# Patient Record
Sex: MALE | Race: WHITE | NOT HISPANIC OR LATINO
[De-identification: names, ages, dates, MRNs, and addresses within clinical notes are randomized per-mention and may not be internally consistent; named-entity substitution may affect disease eponyms.]

---

## 2017-08-10 ENCOUNTER — APPOINTMENT (OUTPATIENT)
Dept: MRI IMAGING | Facility: CLINIC | Age: 62
End: 2017-08-10

## 2017-10-25 ENCOUNTER — RESULT REVIEW (OUTPATIENT)
Age: 62
End: 2017-10-25

## 2019-05-04 ENCOUNTER — EMERGENCY (EMERGENCY)
Facility: HOSPITAL | Age: 64
LOS: 1 days | Discharge: HOME | End: 2019-05-04
Attending: EMERGENCY MEDICINE
Payer: COMMERCIAL

## 2019-05-04 VITALS
HEART RATE: 120 BPM | OXYGEN SATURATION: 99 % | WEIGHT: 210.1 LBS | SYSTOLIC BLOOD PRESSURE: 185 MMHG | DIASTOLIC BLOOD PRESSURE: 97 MMHG | TEMPERATURE: 98 F | RESPIRATION RATE: 16 BRPM | HEIGHT: 69 IN

## 2019-05-04 VITALS
TEMPERATURE: 98 F | SYSTOLIC BLOOD PRESSURE: 182 MMHG | HEART RATE: 102 BPM | OXYGEN SATURATION: 99 % | DIASTOLIC BLOOD PRESSURE: 92 MMHG | RESPIRATION RATE: 18 BRPM

## 2019-05-04 LAB
APPEARANCE UR: CLEAR — SIGNIFICANT CHANGE UP
BACTERIA # UR AUTO: 0 — SIGNIFICANT CHANGE UP
BILIRUB UR-MCNC: NEGATIVE — SIGNIFICANT CHANGE UP
COLOR SPEC: YELLOW — SIGNIFICANT CHANGE UP
DIFF PNL FLD: NEGATIVE — SIGNIFICANT CHANGE UP
EPI CELLS # UR: 0 /HPF — SIGNIFICANT CHANGE UP
GLUCOSE UR QL: NEGATIVE — SIGNIFICANT CHANGE UP
HYALINE CASTS # UR AUTO: 0 /LPF — SIGNIFICANT CHANGE UP (ref 0–2)
KETONES UR-MCNC: NEGATIVE — SIGNIFICANT CHANGE UP
LEUKOCYTE ESTERASE UR-ACNC: NEGATIVE — SIGNIFICANT CHANGE UP
NITRITE UR-MCNC: NEGATIVE — SIGNIFICANT CHANGE UP
PH UR: 6.5 — SIGNIFICANT CHANGE UP (ref 5–8)
PROT UR-MCNC: 100 — SIGNIFICANT CHANGE UP
RBC CASTS # UR COMP ASSIST: 2 /HPF — SIGNIFICANT CHANGE UP (ref 0–4)
SP GR SPEC: 1.01 — SIGNIFICANT CHANGE UP (ref 1.01–1.02)
UROBILINOGEN FLD QL: NEGATIVE — SIGNIFICANT CHANGE UP
WBC UR QL: 0 /HPF — SIGNIFICANT CHANGE UP (ref 0–5)

## 2019-05-04 PROCEDURE — 81001 URINALYSIS AUTO W/SCOPE: CPT

## 2019-05-04 PROCEDURE — 87086 URINE CULTURE/COLONY COUNT: CPT

## 2019-05-04 PROCEDURE — 99284 EMERGENCY DEPT VISIT MOD MDM: CPT | Mod: 25

## 2019-05-04 PROCEDURE — 51702 INSERT TEMP BLADDER CATH: CPT

## 2019-05-04 PROCEDURE — 51798 US URINE CAPACITY MEASURE: CPT

## 2019-05-04 NOTE — ED ADULT NURSE REASSESSMENT NOTE - NS ED NURSE REASSESS COMMENT FT1
MIGEL Marie placed khanna catheter with MD Bess at bedside to confirm sterility. 900cc urine drained from khanna.  Pt reporting mild relief after catheter insertion.

## 2019-05-04 NOTE — ED PROVIDER NOTE - CARE PROVIDER_API CALL
Goldberg, Gary D (MD)  Urology  [address] [place] [address], [address]  [place] [address]  Phone: [phone]  Fax: [phone]  Follow Up Time:

## 2019-05-04 NOTE — ED PROVIDER NOTE - ATTENDING CONTRIBUTION TO CARE
Attending MD Bess:   I personally have seen and examined this patient.  Physician assistant note reviewed and agree on plan of care and except where noted.  See below for details.     Seen in [facility]  Dr. Gary Goldberg, Uro    63M with PMH/PSH including BPH, HTN on Amlodipine, , HLD on Rosuvastatin, fatty liver presents to the ED with urinary retention.  Reports was able to urinate this morning.  Reports had MRI of prostate in April, was told it was enlarged, but otherwise "fine".  Denies vomiting, diarrhea, blood in stools. Reports was able to move stools.  Reports flew back from [place], [place] and came straight here.  Denies fevers, chills.  Denies chest pain, shortness of breath, palpitations.  On exam, appears uncomfortable, unlabored breathing, abdomen soft, mild suprapubic tenderness that resolved with khanna placement, no testicular tenderness, no genital lesions, khanna placed during initial assessment (see procedure note), immediate drainage of 900cc of yellow clear urine.  A/P: 63M with urinary retention, enlarged prostate on US and at least 900cc of Ur output, will check UA, UrCx to rule out infection but low suspicion, will send out with leg bag and follow up with Dr. Goldberg on Monday 5/6/19

## 2019-05-04 NOTE — ED PROVIDER NOTE - NSFOLLOWUPINSTRUCTIONS_ED_ALL_ED_FT
1- Follow up with Dr Goldberg on Monday as planned  2- Motrin / Tylenol as needed for pain  3- Any worsening pain, new symptoms come back to the ER immediately

## 2019-05-04 NOTE — ED PROVIDER NOTE - NS ED ROS FT
Attending MD Bess: See Attending Statement
Class II - visualization of the soft palate, fauces, and uvula

## 2019-05-04 NOTE — ED ADULT NURSE NOTE - OBJECTIVE STATEMENT
64 y/o male PMH BPH, HTN, presents to ED c/o urinary retention. Pt states he was unable to urinate this morning. Took a 5-6 hour flight from [place] this morning and was in "excruciating pain" the entire flight. Reports urinary frequency, but is unable to start stream. Has MRI of prostate in April, was otld it was enlarged and to take flomax, but pt decided against the med because he didn't want to be on medication. Denying n/v/d, fever, chills, hematuria.

## 2019-05-05 LAB
CULTURE RESULTS: NO GROWTH — SIGNIFICANT CHANGE UP
SPECIMEN SOURCE: SIGNIFICANT CHANGE UP

## 2020-07-13 PROBLEM — N40.1 BENIGN PROSTATIC HYPERPLASIA WITH LOWER URINARY TRACT SYMPTOMS: Chronic | Status: ACTIVE | Noted: 2019-05-04

## 2020-07-28 DIAGNOSIS — Z01.818 ENCOUNTER FOR OTHER PREPROCEDURAL EXAMINATION: ICD-10-CM

## 2020-07-30 ENCOUNTER — APPOINTMENT (OUTPATIENT)
Dept: DISASTER EMERGENCY | Facility: CLINIC | Age: 65
End: 2020-07-30

## 2020-07-30 LAB — SARS-COV-2 N GENE NPH QL NAA+PROBE: NOT DETECTED

## 2020-08-11 ENCOUNTER — TRANSCRIPTION ENCOUNTER (OUTPATIENT)
Age: 65
End: 2020-08-11

## 2021-10-15 ENCOUNTER — APPOINTMENT (OUTPATIENT)
Dept: INTERVENTIONAL RADIOLOGY/VASCULAR | Facility: CLINIC | Age: 66
End: 2021-10-15
Payer: MEDICARE

## 2021-10-15 DIAGNOSIS — N28.1 CYST OF KIDNEY, ACQUIRED: ICD-10-CM

## 2021-10-15 DIAGNOSIS — I10 ESSENTIAL (PRIMARY) HYPERTENSION: ICD-10-CM

## 2021-10-15 DIAGNOSIS — Z78.9 OTHER SPECIFIED HEALTH STATUS: ICD-10-CM

## 2021-10-15 DIAGNOSIS — Z87.891 PERSONAL HISTORY OF NICOTINE DEPENDENCE: ICD-10-CM

## 2021-10-15 DIAGNOSIS — E78.5 HYPERLIPIDEMIA, UNSPECIFIED: ICD-10-CM

## 2021-10-15 DIAGNOSIS — K21.9 GASTRO-ESOPHAGEAL REFLUX DISEASE W/OUT ESOPHAGITIS: ICD-10-CM

## 2021-10-15 PROCEDURE — 99204 OFFICE O/P NEW MOD 45 MIN: CPT | Mod: 95

## 2021-10-15 RX ORDER — LISINOPRIL 10 MG/1
10 TABLET ORAL DAILY
Refills: 0 | Status: ACTIVE | COMMUNITY
Start: 2021-10-15

## 2021-10-15 RX ORDER — AMLODIPINE BESYLATE 10 MG/1
10 TABLET ORAL DAILY
Refills: 0 | Status: ACTIVE | COMMUNITY
Start: 2021-10-15

## 2021-10-26 ENCOUNTER — APPOINTMENT (OUTPATIENT)
Dept: CT IMAGING | Facility: CLINIC | Age: 66
End: 2021-10-26

## 2021-10-26 PROCEDURE — 72191 CT ANGIOGRAPH PELV W/O&W/DYE: CPT | Mod: 26,MH

## 2021-10-29 ENCOUNTER — OUTPATIENT (OUTPATIENT)
Dept: OUTPATIENT SERVICES | Facility: HOSPITAL | Age: 66
LOS: 1 days | End: 2021-10-29

## 2021-10-29 VITALS
HEIGHT: 69 IN | SYSTOLIC BLOOD PRESSURE: 152 MMHG | OXYGEN SATURATION: 97 % | TEMPERATURE: 99 F | HEART RATE: 96 BPM | WEIGHT: 188.94 LBS | RESPIRATION RATE: 18 BRPM | DIASTOLIC BLOOD PRESSURE: 93 MMHG

## 2021-10-29 DIAGNOSIS — Z11.52 ENCOUNTER FOR SCREENING FOR COVID-19: ICD-10-CM

## 2021-10-29 DIAGNOSIS — K21.9 GASTRO-ESOPHAGEAL REFLUX DISEASE WITHOUT ESOPHAGITIS: ICD-10-CM

## 2021-10-29 DIAGNOSIS — N40.1 BENIGN PROSTATIC HYPERPLASIA WITH LOWER URINARY TRACT SYMPTOMS: ICD-10-CM

## 2021-10-29 DIAGNOSIS — G47.33 OBSTRUCTIVE SLEEP APNEA (ADULT) (PEDIATRIC): ICD-10-CM

## 2021-10-29 DIAGNOSIS — Z98.890 OTHER SPECIFIED POSTPROCEDURAL STATES: Chronic | ICD-10-CM

## 2021-10-29 DIAGNOSIS — N40.0 BENIGN PROSTATIC HYPERPLASIA WITHOUT LOWER URINARY TRACT SYMPTOMS: ICD-10-CM

## 2021-10-29 DIAGNOSIS — I10 ESSENTIAL (PRIMARY) HYPERTENSION: ICD-10-CM

## 2021-10-29 LAB
ANION GAP SERPL CALC-SCNC: 14 MMOL/L — SIGNIFICANT CHANGE UP (ref 5–17)
BLD GP AB SCN SERPL QL: NEGATIVE — SIGNIFICANT CHANGE UP
BUN SERPL-MCNC: 14 MG/DL — SIGNIFICANT CHANGE UP (ref 7–23)
CALCIUM SERPL-MCNC: 10.1 MG/DL — SIGNIFICANT CHANGE UP (ref 8.4–10.5)
CHLORIDE SERPL-SCNC: 99 MMOL/L — SIGNIFICANT CHANGE UP (ref 96–108)
CO2 SERPL-SCNC: 24 MMOL/L — SIGNIFICANT CHANGE UP (ref 22–31)
CREAT SERPL-MCNC: 0.77 MG/DL — SIGNIFICANT CHANGE UP (ref 0.5–1.3)
GLUCOSE SERPL-MCNC: 104 MG/DL — HIGH (ref 70–99)
HCT VFR BLD CALC: 48.9 % — SIGNIFICANT CHANGE UP (ref 39–50)
HGB BLD-MCNC: 15.9 G/DL — SIGNIFICANT CHANGE UP (ref 13–17)
MCHC RBC-ENTMCNC: 27.9 PG — SIGNIFICANT CHANGE UP (ref 27–34)
MCHC RBC-ENTMCNC: 32.5 GM/DL — SIGNIFICANT CHANGE UP (ref 32–36)
MCV RBC AUTO: 85.8 FL — SIGNIFICANT CHANGE UP (ref 80–100)
NRBC # BLD: 0 /100 WBCS — SIGNIFICANT CHANGE UP (ref 0–0)
PLATELET # BLD AUTO: 195 K/UL — SIGNIFICANT CHANGE UP (ref 150–400)
POTASSIUM SERPL-MCNC: 4.1 MMOL/L — SIGNIFICANT CHANGE UP (ref 3.5–5.3)
POTASSIUM SERPL-SCNC: 4.1 MMOL/L — SIGNIFICANT CHANGE UP (ref 3.5–5.3)
RBC # BLD: 5.7 M/UL — SIGNIFICANT CHANGE UP (ref 4.2–5.8)
RBC # FLD: 13.7 % — SIGNIFICANT CHANGE UP (ref 10.3–14.5)
RH IG SCN BLD-IMP: POSITIVE — SIGNIFICANT CHANGE UP
SARS-COV-2 RNA SPEC QL NAA+PROBE: SIGNIFICANT CHANGE UP
SODIUM SERPL-SCNC: 137 MMOL/L — SIGNIFICANT CHANGE UP (ref 135–145)
WBC # BLD: 6.18 K/UL — SIGNIFICANT CHANGE UP (ref 3.8–10.5)
WBC # FLD AUTO: 6.18 K/UL — SIGNIFICANT CHANGE UP (ref 3.8–10.5)

## 2021-10-29 NOTE — H&P PST ADULT - NSANTHOSAYNRD_GEN_A_CORE
No. LEATHA screening performed.  STOP BANG Legend: 0-2 = LOW Risk; 3-4 = INTERMEDIATE Risk; 5-8 = HIGH Risk

## 2021-10-29 NOTE — H&P PST ADULT - NSICDXPASTMEDICALHX_GEN_ALL_CORE_FT
PAST MEDICAL HISTORY:  Enlarged prostate with urinary retention     GERD (gastroesophageal reflux disease)     Hyperlipidemia     Hypertension      PAST MEDICAL HISTORY:  Enlarged prostate with urinary retention     GERD (gastroesophageal reflux disease)     History of tinnitus     Hyperlipidemia     Hypertension

## 2021-11-01 ENCOUNTER — INPATIENT (INPATIENT)
Facility: HOSPITAL | Age: 66
LOS: 0 days | Discharge: HOME | DRG: 983 | End: 2021-11-02
Attending: HOSPITALIST | Admitting: RADIOLOGY
Payer: COMMERCIAL

## 2021-11-01 VITALS
TEMPERATURE: 98 F | HEART RATE: 82 BPM | DIASTOLIC BLOOD PRESSURE: 98 MMHG | HEIGHT: 69 IN | RESPIRATION RATE: 18 BRPM | SYSTOLIC BLOOD PRESSURE: 155 MMHG | OXYGEN SATURATION: 98 % | WEIGHT: 188.05 LBS

## 2021-11-01 DIAGNOSIS — Z98.890 OTHER SPECIFIED POSTPROCEDURAL STATES: Chronic | ICD-10-CM

## 2021-11-01 DIAGNOSIS — N40.1 BENIGN PROSTATIC HYPERPLASIA WITH LOWER URINARY TRACT SYMPTOMS: ICD-10-CM

## 2021-11-01 DIAGNOSIS — M47.817 SPONDYLOSIS WITHOUT MYELOPATHY OR RADICULOPATHY, LUMBOSACRAL REGION: ICD-10-CM

## 2021-11-01 DIAGNOSIS — K57.30 DIVERTICULOSIS OF LARGE INTESTINE WITHOUT PERFORATION OR ABSCESS WITHOUT BLEEDING: ICD-10-CM

## 2021-11-01 DIAGNOSIS — I70.0 ATHEROSCLEROSIS OF AORTA: ICD-10-CM

## 2021-11-01 LAB
BLD GP AB SCN SERPL QL: NEGATIVE — SIGNIFICANT CHANGE UP
INR BLD: 1.08 RATIO — SIGNIFICANT CHANGE UP (ref 0.88–1.16)
PROTHROM AB SERPL-ACNC: 12.9 SEC — SIGNIFICANT CHANGE UP (ref 10.6–13.6)
RH IG SCN BLD-IMP: POSITIVE — SIGNIFICANT CHANGE UP

## 2021-11-01 PROCEDURE — 76380 CAT SCAN FOLLOW-UP STUDY: CPT | Mod: 26,XU

## 2021-11-01 PROCEDURE — 36247 INS CATH ABD/L-EXT ART 3RD: CPT | Mod: 59

## 2021-11-01 PROCEDURE — 37243 VASC EMBOLIZE/OCCLUDE ORGAN: CPT

## 2021-11-01 PROCEDURE — 99223 1ST HOSP IP/OBS HIGH 75: CPT

## 2021-11-01 PROCEDURE — 76937 US GUIDE VASCULAR ACCESS: CPT | Mod: 26

## 2021-11-01 RX ORDER — LISINOPRIL 2.5 MG/1
10 TABLET ORAL DAILY
Refills: 0 | Status: DISCONTINUED | OUTPATIENT
Start: 2021-11-01 | End: 2021-11-02

## 2021-11-01 RX ORDER — LIDOCAINE 4 G/100G
1 CREAM TOPICAL THREE TIMES A DAY
Refills: 0 | Status: DISCONTINUED | OUTPATIENT
Start: 2021-11-01 | End: 2021-11-02

## 2021-11-01 RX ORDER — TAMSULOSIN HYDROCHLORIDE 0.4 MG/1
0.8 CAPSULE ORAL AT BEDTIME
Refills: 0 | Status: DISCONTINUED | OUTPATIENT
Start: 2021-11-01 | End: 2021-11-02

## 2021-11-01 RX ORDER — OMEPRAZOLE 10 MG/1
1 CAPSULE, DELAYED RELEASE ORAL
Qty: 0 | Refills: 0 | DISCHARGE

## 2021-11-01 RX ORDER — ALPRAZOLAM 0.25 MG
1 TABLET ORAL ONCE
Refills: 0 | Status: DISCONTINUED | OUTPATIENT
Start: 2021-11-01 | End: 2021-11-01

## 2021-11-01 RX ORDER — INFLUENZA VIRUS VACCINE 15; 15; 15; 15 UG/.5ML; UG/.5ML; UG/.5ML; UG/.5ML
0.7 SUSPENSION INTRAMUSCULAR ONCE
Refills: 0 | Status: DISCONTINUED | OUTPATIENT
Start: 2021-11-01 | End: 2021-11-02

## 2021-11-01 RX ORDER — ROSUVASTATIN CALCIUM 5 MG/1
1 TABLET ORAL
Qty: 0 | Refills: 0 | DISCHARGE

## 2021-11-01 RX ORDER — ALFUZOSIN HYDROCHLORIDE 10 MG/1
1 TABLET, EXTENDED RELEASE ORAL
Qty: 0 | Refills: 0 | DISCHARGE

## 2021-11-01 RX ORDER — PANTOPRAZOLE SODIUM 20 MG/1
40 TABLET, DELAYED RELEASE ORAL
Refills: 0 | Status: DISCONTINUED | OUTPATIENT
Start: 2021-11-01 | End: 2021-11-02

## 2021-11-01 RX ORDER — ACETAMINOPHEN 500 MG
650 TABLET ORAL EVERY 6 HOURS
Refills: 0 | Status: DISCONTINUED | OUTPATIENT
Start: 2021-11-01 | End: 2021-11-02

## 2021-11-01 RX ORDER — LANOLIN ALCOHOL/MO/W.PET/CERES
3 CREAM (GRAM) TOPICAL ONCE
Refills: 0 | Status: COMPLETED | OUTPATIENT
Start: 2021-11-01 | End: 2021-11-02

## 2021-11-01 RX ADMIN — Medication 1 MILLIGRAM(S): at 14:42

## 2021-11-01 RX ADMIN — Medication 650 MILLIGRAM(S): at 21:20

## 2021-11-01 RX ADMIN — Medication 650 MILLIGRAM(S): at 22:20

## 2021-11-01 RX ADMIN — TAMSULOSIN HYDROCHLORIDE 0.8 MILLIGRAM(S): 0.4 CAPSULE ORAL at 21:19

## 2021-11-01 RX ADMIN — LIDOCAINE 1 APPLICATION(S): 4 CREAM TOPICAL at 21:20

## 2021-11-01 NOTE — ASU DISCHARGE PLAN (ADULT/PEDIATRIC) - CARE PROVIDER_API CALL
Gaurav Redmond  INTERVENTIONAL RADIOLOGY AND DIAGNOSTIC RADIOLOGY  [address] [place] [address]  Phone: [phone]  Fax: [phone]  Follow Up Time:

## 2021-11-01 NOTE — H&P ADULT - NSHPPHYSICALEXAM_GEN_ALL_CORE
T(C): 36.4 (11-01-21 @ 12:25), Max: 36.9 (11-01-21 @ 07:37)  HR: 70 (11-01-21 @ 14:25) (55 - 82)  BP: 157/95 (11-01-21 @ 14:25) (114/80 - 157/95)  RR: 23 (11-01-21 @ 14:25) (14 - 23)  SpO2: 100% (11-01-21 @ 14:25) (97% - 100%)    GEN: (fe)male in NAD, appears comfortable, no diaphoresis  EYES: No scleral injection, PERRL, EOMI  ENTM: neck supple & symmetric without tracheal deviation, moist membranes, no gross hearing impairment, thyroid gland not enlarged  CV: +S1/S2, no m/r/g, no abdominal bruit, no LE edema  RESP: breathing comfortably, no respiratory accessory muscle use, CTAB, no w/r/r  GI: normoactive BS, soft, NTND, no rebounding/guarding, no palpable masses  LYMPHATICS: no LAD or tenderness to palpation  NEURO: AOx3, no focal deficits, CNII-XII grossly intact  PSYCH: No SI/HI/AVH, appropriate affect, appropriate insight/judgment   SKIN: no petechiae, ecchymosis or maculopapular rash noted T(C): 36.4 (11-01-21 @ 12:25), Max: 36.9 (11-01-21 @ 07:37)  HR: 70 (11-01-21 @ 14:25) (55 - 82)  BP: 157/95 (11-01-21 @ 14:25) (114/80 - 157/95)  RR: 23 (11-01-21 @ 14:25) (14 - 23)  SpO2: 100% (11-01-21 @ 14:25) (97% - 100%)    GEN: male in NAD, appears comfortable, no diaphoresis  EYES: No scleral injection, PERRL, EOMI  ENTM: neck supple & symmetric without tracheal deviation, moist membranes, no gross hearing impairment, thyroid gland not enlarged  CV: +S1/S2, no m/r/g, no abdominal bruit, no LE edema  RESP: breathing comfortably, no respiratory accessory muscle use, CTAB, no w/r/r  GI: normoactive BS, soft, NTND, no rebounding/guarding, no palpable masses  LYMPHATICS: no LAD or tenderness to palpation  NEURO: AOx3, no focal deficits, CNII-XII grossly intact  PSYCH: No SI/HI/AVH, appropriate affect, appropriate insight/judgment   SKIN: no petechiae, ecchymosis or maculopapular rash noted

## 2021-11-01 NOTE — H&P ADULT - HISTORY OF PRESENT ILLNESS
66M with PMHx of BPH (with urinary symptoms) and HTN presents for elective prostate artery embolization by IR for BPH w/ symptoms. Patient s/p bilateral prostate artery embolization with coil embolization of left penile artery and coil embolization of the right inferior vesicular artery. Right groin hemostasis achieved with Celt vascular closure device. Patient received propofol, fentanyl, Midazolam and naya-procedure ciprofloxacin. Patient seen in IR and has no complaints. Santiago placed naya-procedure and draining yellow urine. Patient also received Xanax for anxiousness. He has some discomfort in penis from Santiago. Patient being admitted to medicine to monitor overnight.

## 2021-11-01 NOTE — ASU PATIENT PROFILE, ADULT - NSICDXPASTMEDICALHX_GEN_ALL_CORE_FT
PAST MEDICAL HISTORY:  Enlarged prostate with urinary retention     GERD (gastroesophageal reflux disease)     History of tinnitus     Hyperlipidemia     Hypertension

## 2021-11-01 NOTE — H&P ADULT - ASSESSMENT
66M with PMHx of BPH (with urinary symptoms) and HTN presents for elective prostate artery embolization by IR for BPH w/ symptoms. Patient s/p bilateral prostate artery embolization with coil embolization of left penile artery and coil embolization of the right inferior vesicular artery. Patient being admitted for monitoring overnight.

## 2021-11-01 NOTE — PROCEDURE NOTE - PROCEDURE FINDINGS AND DETAILS
Status post bilateral prostate artery embolization.  Coil embolization of the left penile artery and coil embolization of the right inferior vesicular artery to prevent non-target particle embolization  Right groin hemostasis with Celt vascular closure device  No acute complications

## 2021-11-01 NOTE — ASU DISCHARGE PLAN (ADULT/PEDIATRIC) - ASU DC SPECIAL INSTRUCTIONSFT
You underwent a Prostate Artery Embolization (PAE) procedure with Dr Redmond on 11/1/21    - You may experience PAE Syndrome for the next few days for up to 1 week, which may consist of a low grade fever (below 101 F), flu-like symptoms, body aches, nausea, vomiting, pelvic pressure / pain, painful urination - this is normal. If you were prescribed post - op medications, take them as prescribed for symptom relief.     - Urine, semen or bowel movements may be blood-tinged for the next few days to weeks - this is normal.    - You may experience 1-2 incidents of blood in your urine with or without the passage of clots, which may last a couple of days per incident, over the next couple of months - this is normal as your prostate remodels.    Follow - Up Requirements:  -  Please call your urologist to schedule a follow up appointment.  This appointment is typically 1 - 2 WEEKS after your PAE. If you were discharged with a khanna catheter, your urologist will determine when it can be removed. Your urologist will also manage your prostate prescriptions, if you are currently on medications.    - Please call the Interventional Radiology office @ [phone] to schedule a ONE MONTH follow up consultation with your IR Doctor. Due to the current pandemic, this may be conducted via Telehealth services, for your safety.      Should you have any questions or concerns regarding the above, please call the IR Nurse Practitioner at [phone] or [phone] Mon - Fri 8 am -4 pm.      ***If you develop a problem that you believe requires IMMEDIATE attention, please go to your NEAREST Emergency Room or call 911***     *If you believe your problem can safely wait until you speak to a Doctor, please call your Urologist & IR at [phone]. After 6 pm on weekdays, or on weekends or holidays, please call [facility] at [phone] and ask for the" Interventional Radiology Resident on call"*  Symptoms to Report include:  Inability to urinate once you are discharged, develop a fever above 101, prolonged burning or painful urination, foul-smelling urine, inability to urinate, more than 1-2 episodes of blood in your urine in a month / right groin access site pain, bleeding, bruising that spreads or hardened area below the skin / right leg numbness, tingling or pain.

## 2021-11-01 NOTE — PRE PROCEDURE NOTE - PRE PROCEDURE EVALUATION
HPI: 63 year old male with a history of BPH and HTN with urinary tract symptoms of retention, presents for prostate artery embolization.     NPO status: Since midnight  Anticoagulation: None  Antibiotics: None    Allergies: No Known Allergies    PAST MEDICAL & SURGICAL HISTORY:  Enlarged prostate with urinary retention  Hypertension  Hyperlipidemia  GERD (gastroesophageal reflux disease)  History of tinnitus  History of tonsillectomy and adenoidectomy  childhood    Pertinent labs:    Consent: Procedure/risks/ Benefits explained. Informed consent obtained. Pt verbalizes understanding.

## 2021-11-01 NOTE — H&P ADULT - PROBLEM SELECTOR PLAN 1
-Patient s/p embolization of prostate artery --> will follow up with IR for clearance  -TOV in the AM  -Flomax 0.8 mg qhs (formulary equivalent of Alfuzosin)   -Monitor puncture site (right femoral artery)  -Outpatient urology follow up

## 2021-11-01 NOTE — CHART NOTE - NSCHARTNOTEFT_GEN_A_CORE
MEDICINE NP    JAGDEEP FARMER  66y Male    Patient is a 66y old  Male who presents with a chief complaint of Prostate Artery Embolization (01 Nov 2021 14:50)       > Event Summary:  Notified by RN, Patient c/o rectal pain. Evaluated and assessed patient at bedside. AOx4, VS stable. NAD. Pt states that he is having pain near the inside of his rectum and can only describe it only as 100 times worse than hemorrhoids.         -Vital Signs Last 24 Hrs  T(C): 37.2 (01 Nov 2021 21:39), Max: 37.2 (01 Nov 2021 21:39)  T(F): 99 (01 Nov 2021 21:39), Max: 99 (01 Nov 2021 21:39)  HR: 71 (01 Nov 2021 21:39) (55 - 82)  BP: 141/89 (01 Nov 2021 21:39) (114/80 - 157/95)  BP(mean): 104 (01 Nov 2021 16:04) (91 - 115)  RR: 17 (01 Nov 2021 21:39) (14 - 23)  SpO2: 96% (01 Nov 2021 21:39) (96% - 100%)    > Physical Assessment:  General: Awake, No acute distress.   Neurology: A&Ox3, nonfocal  CV: +S1S2, RRR.  No peripheral edema  Respiratory: Even, unlabored.  CTA B/L.    Abdomen:  +BS.  Soft, NT, ND.  No palpable mass  MSK: CHIANG x4.   Skin: Warm and Dry         > Assessment & Plan:  - HPI:     -Plan:       Spoke with IR, recommended:  -monitoring patient for now       Yolette MADISON #  Department of Medicine MEDICINE NP    JAGDEEP FARMER  66y Male    Patient is a 66y old  Male who presents with a chief complaint of Prostate Artery Embolization (01 Nov 2021 14:50)       > Event Summary:  Notified by RN, Patient c/o rectal pain. Evaluated and assessed patient at bedside. AOx4, VS stable. NAD. R. groin site C/D/I, no hematoma, ecchymosis, +pulses bilat lower ext. Santiago in place. Pt states that he is having pain near the inside of his rectum and can only describe it as "100 times worse than hemorrhoids." Spoke with IR, discussed recent complaints and findings, recommendations to monitor patient and vitals closely for now. IR will see patient early in the AM to evaluate.     -Vital Signs Last 24 Hrs  T(C): 37.2 (01 Nov 2021 21:39), Max: 37.2 (01 Nov 2021 21:39)  T(F): 99 (01 Nov 2021 21:39), Max: 99 (01 Nov 2021 21:39)  HR: 71 (01 Nov 2021 21:39) (55 - 82)  BP: 141/89 (01 Nov 2021 21:39) (114/80 - 157/95)  BP(mean): 104 (01 Nov 2021 16:04) (91 - 115)  RR: 17 (01 Nov 2021 21:39) (14 - 23)  SpO2: 96% (01 Nov 2021 21:39) (96% - 100%)        [id number]  Department of Medicine

## 2021-11-01 NOTE — PRE-ANESTHESIA EVALUATION ADULT - NSANTHPMHFT_GEN_ALL_CORE
66M with HTN, HLD, tinnitus, well-controlled GERD, cervical disc herniations with radiculopathy (no current symptoms), prostate enlargement.

## 2021-11-01 NOTE — H&P ADULT - NSHPADDITIONALINFOADULT_GEN_ALL_CORE
Discussed with CORTNEY Kuhn  Pager: [phone]  Off-Hours: [phone]  Available on MS Teams Discussed with IR. Likely d/c 11/2 after monitoring overnight and ANNABELLE Kuhn  Pager: [phone]  Off-Hours: [phone]  Available on MS Teams

## 2021-11-02 ENCOUNTER — TRANSCRIPTION ENCOUNTER (OUTPATIENT)
Age: 66
End: 2021-11-02

## 2021-11-02 VITALS
DIASTOLIC BLOOD PRESSURE: 83 MMHG | TEMPERATURE: 99 F | HEART RATE: 79 BPM | OXYGEN SATURATION: 95 % | SYSTOLIC BLOOD PRESSURE: 155 MMHG | RESPIRATION RATE: 18 BRPM

## 2021-11-02 LAB
ANION GAP SERPL CALC-SCNC: 15 MMOL/L — SIGNIFICANT CHANGE UP (ref 5–17)
BUN SERPL-MCNC: 14 MG/DL — SIGNIFICANT CHANGE UP (ref 7–23)
CALCIUM SERPL-MCNC: 9.5 MG/DL — SIGNIFICANT CHANGE UP (ref 8.4–10.5)
CHLORIDE SERPL-SCNC: 100 MMOL/L — SIGNIFICANT CHANGE UP (ref 96–108)
CO2 SERPL-SCNC: 21 MMOL/L — LOW (ref 22–31)
COVID-19 SPIKE DOMAIN AB INTERP: POSITIVE
COVID-19 SPIKE DOMAIN ANTIBODY RESULT: 38.9 U/ML — HIGH
CREAT SERPL-MCNC: 0.77 MG/DL — SIGNIFICANT CHANGE UP (ref 0.5–1.3)
GLUCOSE SERPL-MCNC: 126 MG/DL — HIGH (ref 70–99)
HCT VFR BLD CALC: 43.9 % — SIGNIFICANT CHANGE UP (ref 39–50)
HCV AB S/CO SERPL IA: 0.12 S/CO — SIGNIFICANT CHANGE UP (ref 0–0.99)
HCV AB SERPL-IMP: SIGNIFICANT CHANGE UP
HGB BLD-MCNC: 14.8 G/DL — SIGNIFICANT CHANGE UP (ref 13–17)
MCHC RBC-ENTMCNC: 28.4 PG — SIGNIFICANT CHANGE UP (ref 27–34)
MCHC RBC-ENTMCNC: 33.7 GM/DL — SIGNIFICANT CHANGE UP (ref 32–36)
MCV RBC AUTO: 84.1 FL — SIGNIFICANT CHANGE UP (ref 80–100)
NRBC # BLD: 0 /100 WBCS — SIGNIFICANT CHANGE UP (ref 0–0)
PLATELET # BLD AUTO: 167 K/UL — SIGNIFICANT CHANGE UP (ref 150–400)
POTASSIUM SERPL-MCNC: 3.8 MMOL/L — SIGNIFICANT CHANGE UP (ref 3.5–5.3)
POTASSIUM SERPL-SCNC: 3.8 MMOL/L — SIGNIFICANT CHANGE UP (ref 3.5–5.3)
RBC # BLD: 5.22 M/UL — SIGNIFICANT CHANGE UP (ref 4.2–5.8)
RBC # FLD: 13.7 % — SIGNIFICANT CHANGE UP (ref 10.3–14.5)
SARS-COV-2 IGG+IGM SERPL QL IA: 38.9 U/ML — HIGH
SARS-COV-2 IGG+IGM SERPL QL IA: POSITIVE
SODIUM SERPL-SCNC: 136 MMOL/L — SIGNIFICANT CHANGE UP (ref 135–145)
WBC # BLD: 15.06 K/UL — HIGH (ref 3.8–10.5)
WBC # FLD AUTO: 15.06 K/UL — HIGH (ref 3.8–10.5)

## 2021-11-02 PROCEDURE — 85027 COMPLETE CBC AUTOMATED: CPT

## 2021-11-02 PROCEDURE — 86901 BLOOD TYPING SEROLOGIC RH(D): CPT

## 2021-11-02 PROCEDURE — 76380 CAT SCAN FOLLOW-UP STUDY: CPT | Mod: XU

## 2021-11-02 PROCEDURE — C1760: CPT

## 2021-11-02 PROCEDURE — 99239 HOSP IP/OBS DSCHRG MGMT >30: CPT

## 2021-11-02 PROCEDURE — 86803 HEPATITIS C AB TEST: CPT

## 2021-11-02 PROCEDURE — 82565 ASSAY OF CREATININE: CPT

## 2021-11-02 PROCEDURE — 72191 CT ANGIOGRAPH PELV W/O&W/DYE: CPT

## 2021-11-02 PROCEDURE — 99231 SBSQ HOSP IP/OBS SF/LOW 25: CPT

## 2021-11-02 PROCEDURE — 36245 INS CATH ABD/L-EXT ART 1ST: CPT

## 2021-11-02 PROCEDURE — 86850 RBC ANTIBODY SCREEN: CPT

## 2021-11-02 PROCEDURE — 37242 VASC EMBOLIZE/OCCLUDE ARTERY: CPT

## 2021-11-02 PROCEDURE — C1887: CPT

## 2021-11-02 PROCEDURE — C1894: CPT

## 2021-11-02 PROCEDURE — C9803: CPT

## 2021-11-02 PROCEDURE — G0463: CPT

## 2021-11-02 PROCEDURE — 36247 INS CATH ABD/L-EXT ART 3RD: CPT | Mod: 59

## 2021-11-02 PROCEDURE — U0003: CPT

## 2021-11-02 PROCEDURE — 76937 US GUIDE VASCULAR ACCESS: CPT

## 2021-11-02 PROCEDURE — 37243 VASC EMBOLIZE/OCCLUDE ORGAN: CPT

## 2021-11-02 PROCEDURE — U0005: CPT

## 2021-11-02 PROCEDURE — 80048 BASIC METABOLIC PNL TOTAL CA: CPT

## 2021-11-02 PROCEDURE — 86900 BLOOD TYPING SEROLOGIC ABO: CPT

## 2021-11-02 PROCEDURE — 85610 PROTHROMBIN TIME: CPT

## 2021-11-02 PROCEDURE — C1889: CPT

## 2021-11-02 PROCEDURE — C2628: CPT

## 2021-11-02 PROCEDURE — 86769 SARS-COV-2 COVID-19 ANTIBODY: CPT

## 2021-11-02 PROCEDURE — C1769: CPT

## 2021-11-02 RX ORDER — LISINOPRIL 2.5 MG/1
1 TABLET ORAL
Qty: 0 | Refills: 0 | DISCHARGE

## 2021-11-02 RX ORDER — IBUPROFEN 200 MG
400 TABLET ORAL EVERY 6 HOURS
Refills: 0 | Status: DISCONTINUED | OUTPATIENT
Start: 2021-11-02 | End: 2021-11-02

## 2021-11-02 RX ORDER — IBUPROFEN 200 MG
200 TABLET ORAL EVERY 6 HOURS
Refills: 0 | Status: DISCONTINUED | OUTPATIENT
Start: 2021-11-02 | End: 2021-11-02

## 2021-11-02 RX ORDER — LISINOPRIL 2.5 MG/1
1 TABLET ORAL
Qty: 0 | Refills: 0 | DISCHARGE
Start: 2021-11-02

## 2021-11-02 RX ORDER — IBUPROFEN 200 MG
400 TABLET ORAL ONCE
Refills: 0 | Status: DISCONTINUED | OUTPATIENT
Start: 2021-11-02 | End: 2021-11-02

## 2021-11-02 RX ADMIN — Medication 3 MILLIGRAM(S): at 00:24

## 2021-11-02 RX ADMIN — Medication 400 MILLIGRAM(S): at 11:26

## 2021-11-02 RX ADMIN — Medication 400 MILLIGRAM(S): at 10:56

## 2021-11-02 RX ADMIN — LISINOPRIL 10 MILLIGRAM(S): 2.5 TABLET ORAL at 06:07

## 2021-11-02 RX ADMIN — PANTOPRAZOLE SODIUM 40 MILLIGRAM(S): 20 TABLET, DELAYED RELEASE ORAL at 06:07

## 2021-11-02 NOTE — PROGRESS NOTE ADULT - SUBJECTIVE AND OBJECTIVE BOX
[facility] Division of Hospital Medicine  Rosalie Bullock DO  8a-5pm: [phone]  after 5pm call [phone]    Patient is a 66y old  Male who presents with a chief complaint of Prostate Artery Embolization (02 Nov 2021 08:28)        SUBJECTIVE / OVERNIGHT EVENTS: c/o "pain in rectum". urine punch color after voiding since khanna removal      MEDICATIONS  (STANDING):  ibuprofen  Tablet. 400 milliGRAM(s) Oral once  influenza  Vaccine (HIGH DOSE) 0.7 milliLiter(s) IntraMuscular once  lidocaine 2% Gel 1 Application(s) Topical three times a day  lisinopril 10 milliGRAM(s) Oral daily  pantoprazole    Tablet 40 milliGRAM(s) Oral before breakfast  tamsulosin 0.8 milliGRAM(s) Oral at bedtime    MEDICATIONS  (PRN):  acetaminophen     Tablet .. 650 milliGRAM(s) Oral every 6 hours PRN Temp greater or equal to 38C (100.4F), Mild Pain (1 - 3)  ibuprofen  Tablet. 400 milliGRAM(s) Oral every 6 hours PRN Moderate Pain (4 - 6)      Vital Signs Last 24 Hrs  T(C): 37.1 (02 Nov 2021 13:20), Max: 37.2 (01 Nov 2021 21:39)  T(F): 98.7 (02 Nov 2021 13:20), Max: 99 (01 Nov 2021 21:39)  HR: 79 (02 Nov 2021 13:20) (62 - 79)  BP: 155/83 (02 Nov 2021 13:20) (121/103 - 157/95)  BP(mean): 104 (01 Nov 2021 16:04) (104 - 115)  RR: 18 (02 Nov 2021 13:20) (16 - 23)  SpO2: 95% (02 Nov 2021 13:20) (94% - 100%)  CAPILLARY BLOOD GLUCOSE        I&O's Summary    01 Nov 2021 07:01  -  02 Nov 2021 07:00  --------------------------------------------------------  IN: 900 mL / OUT: 2400 mL / NET: -1500 mL    02 Nov 2021 07:01  -  02 Nov 2021 14:04  --------------------------------------------------------  IN: 120 mL / OUT: 100 mL / NET: 20 mL        PHYSICAL EXAM:  GENERAL: NAD, breathing normal  HEAD:  Atraumatic, Normocephalic  EYES: conjunctiva and sclera clear  NECK: supple, No JVD  CHEST/LUNG: CTA b/l  HEART: S1 S2 RRR  ABDOMEN: +BS Soft, NT/ND, no rebound or guarding   EXTREMITIES:  2+ DP Pulses, No c/c. no LE edema  NEUROLOGY: AAOx3, no facial droop, moving all extremities  SKIN: No rashes or lesions    LABS:                        14.8   15.06 )-----------( 167      ( 02 Nov 2021 07:13 )             43.9     11-02    136  |  100  |  14  ----------------------------<  126<H>  3.8   |  21<L>  |  0.77    Ca    9.5      02 Nov 2021 07:11      PT/INR - ( 01 Nov 2021 08:29 )   PT: 12.9 sec;   INR: 1.08 ratio                   RADIOLOGY & ADDITIONAL TESTS:    Imaging Personally Reviewed:  Consultant(s) Notes Reviewed:    Care Discussed with Consultants/Other Providers:

## 2021-11-02 NOTE — DISCHARGE NOTE NURSING/CASE MANAGEMENT/SOCIAL WORK - PATIENT PORTAL LINK FT
You can access the FollowMyHealth Patient Portal offered by [facility] by registering at the following website: [email or web address]. By joining [employer]’s FollowMyHealth portal, you will also be able to view your health information using other applications (apps) compatible with our system.

## 2021-11-02 NOTE — DISCHARGE NOTE PROVIDER - CARE PROVIDER_API CALL
Gaurav Redmond  INTERVENTIONAL RADIOLOGY AND DIAGNOSTIC RADIOLOGY  [address] [place] [address]  Phone: [phone]  Fax: [phone]  Established Patient  Follow Up Time: 2 weeks   Gaurav Redmond  INTERVENTIONAL RADIOLOGY AND DIAGNOSTIC RADIOLOGY  [address] [facility] [place] [address]  Phone: [phone]  Fax: [phone]  Established Patient  Follow Up Time: 1 month

## 2021-11-02 NOTE — DISCHARGE NOTE PROVIDER - NSDCFUADDAPPT_GEN_ALL_CORE_FT
Outpatient urologist within one month Outpatient urologist within one month  Telehealth Interventional radiology follow up in one month -   appointment with Dr. Redmond, patient can make an appointment with IR by calling the IR booking office at [phone]

## 2021-11-02 NOTE — DISCHARGE NOTE PROVIDER - NSDCCPCAREPLAN_GEN_ALL_CORE_FT
PRINCIPAL DISCHARGE DIAGNOSIS  Diagnosis: Prostate hypertrophy  Assessment and Plan of Treatment: follow up with urology and interventional radiology      SECONDARY DISCHARGE DIAGNOSES  Diagnosis: Hypertension  Assessment and Plan of Treatment: continue with home medication    Diagnosis: GERD (gastroesophageal reflux disease)  Assessment and Plan of Treatment: continue with home medication     PRINCIPAL DISCHARGE DIAGNOSIS  Diagnosis: Prostate hypertrophy  Assessment and Plan of Treatment: follow up with urology and interventional radiology  You underwent a Prostate Artery Embolization (PAE) procedure with Dr Redmond on 11/2/2021  - You may experience PAE Syndrome for the next few days for up to 1 week, which may consist of a low grade fever (below 101 F), flu-like symptoms, body aches, nausea, vomiting, pelvic pressure / pain, painful urination - this is normal. If you were prescribed post - op medications, take them as prescribed for symptom relief.   - Urine, semen or bowel movements may be blood-tinged for the next few days to weeks - this is normal.  - You may experience 1-2 incidents of blood in your urine with or without the passage of clots, which may last a couple of days per incident, over the next couple of months - this is normal as your prostate remodels.  Follow - Up Requirements:  -  Please call your urologist to schedule a follow up appointment.  This appointment is typically 4 WEEKS after your PAE. If you were discharged with a khanna catheter, your urologist will determine when it can be removed. Your urologist will also manage your prostate prescriptions, if you are currently on medications.  - Please call the Interventional Radiology office @ [phone] to schedule a ONE MONTH follow up consultation with your IR Doctor. Due to the current pandemic, this may be conducted via Telehealth services, for your safety.    Should you have any questions or concerns regarding the above, please call the IR Nurse Practitioner at [phone] or [phone] Mon - Fri 8 am -4 pm.  ***If you develop a problem that you believe requires IMMEDIATE attention, please go to your NEAREST Emergency Room or call 911*** *If you believe your problem can safely wait until you speak to a Doctor, please call your Urologist & IR at [phone]. After 6 pm on weekdays, or on weekends or holidays, please call [facility] at [phone] and ask for the" Interventional Radiology Resident on call"* Symptoms to Report include:  Inability to urinate once y      SECONDARY DISCHARGE DIAGNOSES  Diagnosis: Hypertension  Assessment and Plan of Treatment: continue with home medication    Diagnosis: GERD (gastroesophageal reflux disease)  Assessment and Plan of Treatment: continue with home medication

## 2021-11-02 NOTE — DISCHARGE NOTE PROVIDER - HOSPITAL COURSE
66M with PMHx of BPH (with urinary symptoms) and HTN presents for elective prostate artery embolization by IR for BPH w/ symptoms. Patient s/p bilateral prostate artery embolization with coil embolization of left penile artery and coil embolization of the right inferior vesicular artery. Right groin hemostasis achieved with Celt vascular closure device. Patient admitted to be monitored overnight by medicine with no acute events. Patient with trial of void performed on 11/2. Patient to follow up with IR on discharge.

## 2021-11-02 NOTE — DISCHARGE NOTE PROVIDER - NSDCMRMEDTOKEN_GEN_ALL_CORE_FT
alfuzosin 10 mg oral tablet, extended release: 1 tab(s) orally once a day (at bedtime)  Crestor 40 mg oral tablet: 1 tab(s) orally once a day  lisinopril 10 mg oral tablet: 1 tab(s) orally once a day  multivitamin: 1 tab(s) orally once a day  omeprazole 20 mg oral delayed release capsule: 1 cap(s) orally once a day

## 2021-11-02 NOTE — PROGRESS NOTE ADULT - PROBLEM SELECTOR PLAN 1
-Patient s/p embolization of prostate artery --> will follow up with IR for clearance  -TOV in the AM  -Flomax 0.8 mg qhs (formulary equivalent of Alfuzosin)   -Monitor puncture site (right femoral artery)  -Outpatient urology follow up -Patient s/p embolization of prostate artery  passed TOV after khanna removal - some hematuria expected   cleared for discharge by IR and urology   patient to notify urology if increased pain or worsened hematuria   -Flomax 0.8 mg qhs (formulary equivalent of Alfuzosin)   -Outpatient urology follow up

## 2021-11-02 NOTE — CONSULT NOTE ADULT - ASSESSMENT
66M with PMHx of BPH now s/p PAE by IR  - observe urine color today, has already voided post khanna removal  - hydration  - ok for discharge from  perspective  - outpatient fu with Dr. Goldberg Richard Maiman, MD  [facility] [address] [address] [phone]

## 2021-11-02 NOTE — PROGRESS NOTE ADULT - PROBLEM SELECTOR PROBLEM 2
Pharmacy Vancomycin Initial Note  Date of Service 2021  Patient's  2022  2 month old, male    Indication: Sepsis    Current estimated CrCl = Estimated Creatinine Clearance: 47 mL/min/1.73m2 (based on SCr of 0.29 mg/dL).    Creatinine for last 3 days  No results found for requested labs within last 72 hours.    Recent Vancomycin Level(s) for last 3 days  No results found for requested labs within last 72 hours.      Vancomycin IV Administrations (past 72 hours)      No vancomycin orders with administrations in past 72 hours.                Nephrotoxins and other renal medications (From now, onward)    Start     Dose/Rate Route Frequency Ordered Stop    21 0754  furosemide (LASIX) pediatric injection 1 mg      1 mg/kg × 1 kg  over 5 Minutes Intravenous EVERY 48 HOURS 21 1135      21 1400  vancomycin 15 mg in D5W injection PEDS/NICU      15 mg/kg × 1.01 kg (Dosing Weight)  over 60 Minutes Intravenous EVERY 12 HOURS 21 1335      21 1400  gentamicin (GARAMYCIN) injection PEDS 3.5 mg      3.5 mg/kg × 1.01 kg (Dosing Weight)  over 60 Minutes Intravenous EVERY 24 HOURS 21 1335            Contrast Orders - past 72 hours (72h ago, onward)    None        Insight Rx dosing recommendation and data:  Regimen: 15 mg IV every 12 hours.  Start time: 14:00 on 2021  Exposure target: AUC24 (range)400-600 mg/L.hr   AUC24,ss: 411 mg/L.hr  Probability of AUC24 > 400: 55 %  Ctrough,ss: 8.1 mg/L  Probability of Ctrough,ss > 20: 0 %  **Would prefer pAUC >80%, but current dose matches previous dosing policy AND patient was born <24 weeks GA & therefore cannot rely on Insight Rx data until 2 levels collected.      Plan:  1. Start vancomycin  15 mg IV q12h.   2. Vancomycin monitoring method: AUC/trough  3. Vancomycin therapeutic monitoring goal: 400-600 mg*h/L and/or 10-15 mg/L trough  4. Pharmacy will check vancomycin levels as appropriate in 1-3 Days.    5. Serum creatinine levels  will be ordered daily for the first week of therapy and at least twice weekly for subsequent weeks.      Claudette Garcia, PharmD, [facility]  Clinical Pharmacist     Hypertension

## 2021-11-02 NOTE — DISCHARGE NOTE PROVIDER - PROVIDER TOKENS
Principal Discharge DX:	CHF exacerbation PROVIDER:[TOKEN:[[id number]],FOLLOWUP:[2 weeks],ESTABLISHEDPATIENT:[T]] PROVIDER:[TOKEN:[[id number]],FOLLOWUP:[1 month],ESTABLISHEDPATIENT:[T]]

## 2021-11-02 NOTE — DISCHARGE NOTE NURSING/CASE MANAGEMENT/SOCIAL WORK - NSDCFUADDAPPT_GEN_ALL_CORE_FT
Outpatient urologist within one month  Telehealth Interventional radiology follow up in one month -   appointment with Dr. Redmond, patient can make an appointment with IR by calling the IR booking office at [phone]

## 2021-11-02 NOTE — CONSULT NOTE ADULT - SUBJECTIVE AND OBJECTIVE BOX
Urology Consult Note    Chief Complaint: s/p PAE      History of Present Illness: 66M with PMHx of BPH (with urinary symptoms) and HTN presents for elective prostate artery embolization by IR for BPH w/ symptoms. Patient s/p bilateral prostate artery embolization with coil embolization of left penile artery and coil embolization of the right inferior vesicular artery. Right groin hemostasis achieved with Celt vascular closure device. Patient admitted to be monitored overnight by medicine with no acute events    Santiago was removed at 6am and patient has voided once, small volume of pink urine. Afebrile and hemodynamically stable with wbc 15. was complaining of rectal pain last night which has improved somewhat.       PAST MEDICAL & SURGICAL HISTORY:  Enlarged prostate with urinary retention    Hypertension    Hyperlipidemia    GERD (gastroesophageal reflux disease)    History of tinnitus    History of tonsillectomy and adenoidectomy  childhood        FAMILY HISTORY:  No pertinent family history in first degree relatives        Allergies    No Known Allergies    Intolerances        Social History:  Denies tobacco or alcohol use    Review of Systems:   Constitutional: No weight loss, no weakness  HEENT: No visual loss, no hearing loss, no sneezing  Skin: No rash or itching  CV: No chest pain, no chest pressure  Pulm: No shortness of breath, cough, or sputum  GI: No melena, no constipation  : Per HPI  Neuro: No headache, dizziness  MSK: No muscle pain, no joint pain  Heme: No anemia, bruising, or bleeding  Lymphatics: No enlarged nodes, no history of splenectomy  Psych: No depression of anxiety  Endo: No cold or heat intolerance  Allergies: No asthma, hives    Physical Exam:  Vital signs  T(C): 37.2 (11-02-21 @ 01:30), Max: 37.2 (11-01-21 @ 21:39)  HR: 72 (11-02-21 @ 01:30)  BP: 127/66 (11-02-21 @ 01:30)  SpO2: 94% (11-02-21 @ 01:30)  Wt(kg): --    Gen: No acute distress. Normal mood  HEENT: Normocephalic, neck supple  CV: Hemodynamically stable  Pulm: No increased work of breathing  Abd: soft, non-tender, non-distended  Back: No CVA tenderness, no midline pain  Extremities: No significant deformity or joint abnormality  Neuro: Alert & oriented x3, No focal deficits  Skin: Skin normal color, normal texture  Psych: Normal affect, normal behavior  : Nonpalpable bladder, bedside urinal with pink urine      Labs:      11-02 @ 07:13    WBC 15.06 / Hct 43.9  / SCr --       11-02 @ 07:11    WBC --    / Hct --    / SCr 0.77     11-02    136  |  100  |  14  ----------------------------<  126<H>  3.8   |  21<L>  |  0.77    Ca    9.5      02 Nov 2021 07:11      PT/INR - ( 01 Nov 2021 08:29 )   PT: 12.9 sec;   INR: 1.08 ratio

## 2021-11-02 NOTE — PROGRESS NOTE ADULT - SUBJECTIVE AND OBJECTIVE BOX
Interventional Radiology Follow-Up Note.     Patient seen and examined @ bedside between 7-730a    This is a 66y Male s/p PAE on 11/1 in Interventional Radiology with Dr. Redmond.     s:  seen at bedside, admitted to having pain radiating to his bladder during the duration of khanna for overnight.    Since 6am , khanna dc'd and patient on voiding trial. states he feels much better. pain level 2/10 sore like pain around his prostate.     Medication:   lisinopril: (11-02)  tamsulosin: (11-01)    Vitals:   T(F): 98.9, Max: 99 (21:39)  HR: 72  BP: 127/66  RR: 18  SpO2: 94%    Physical Exam:  General: Nontoxic, in NAD.  Abdomen: soft, NTND.   :   khanna dc'd        LABS:  Na:   K:   Cl:   CO2:   BUN:   Cr:   Glu:     Hgb: 14.8 (11-02 @ 07:13)  Hct: 43.9 (11-02 @ 07:13)  WBC: 15.06 (11-02 @ 07:13)  Plt: 167 (11-02 @ 07:13)    INR: 1.08 11-01-21 @ 08:29  PTT:                   Assessment/Plan:  63 year old male with a history of BPH and HTN with urinary tract symptoms of retention, presents for prostate artery embolization.     POD1 :  Pt most recently s/p PAE w/ Dr. Redmond on 11/1.     - follow up in 2 weeks with virtual appointment with Dr. Redmond, patient can make an appointment with IR by calling the IR booking office at [phone]; recommend IR follow in 2 weeks.    - khanna removed at 6am (11/1), now on voiding trial - duration 6hrs, once pt voids, can plan for discharge today  - post op labs- pending ; will follow up  - pain control , motrin 200mg q 6hrs x 7 days.   - case tbd w/Dr. Redmond    Please call IR at [phone] with any questions, concerns, or issues regarding above.       Interventional Radiology Follow-Up Note.     Patient seen and examined @ bedside between 7-730a    This is a 66y Male s/p PAE on 11/1 in Interventional Radiology with Dr. Redmond.     s:  seen at bedside, admitted to having pain radiating to his bladder during the duration of khanna for overnight.    Since 6am , khanna dc'd and patient on voiding trial. states he feels much better. pain level 2/10 sore like pain around his prostate.       Medication:   lisinopril: (11-02)  tamsulosin: (11-01)    Vitals:   T(F): 98.9, Max: 99 (21:39)  HR: 72  BP: 127/66  RR: 18  SpO2: 94%    Physical Exam:  General: Nontoxic, in NAD.  Abdomen: soft, NTND.   :   khanna dc'd      LABS:  Na: 136 (11-02 @ 07:11)  K: 3.8 (11-02 @ 07:11)  Cl: 100 (11-02 @ 07:11)  CO2: 21 (11-02 @ 07:11)  BUN: 14 (11-02 @ 07:11)  Cr: 0.77 (11-02 @ 07:11)  Glu: 126(11-02 @ 07:11)    Hgb: 14.8 (11-02 @ 07:13)  Hct: 43.9 (11-02 @ 07:13)  WBC: 15.06 (11-02 @ 07:13)  Plt: 167 (11-02 @ 07:13)    INR: 1.08 11-01-21 @ 08:29  PTT:            Assessment/Plan:  63 year old male with a history of BPH and HTN with urinary tract symptoms of retention, presents for prostate artery embolization.     POD1 :  Pt most recently s/p PAE w/ Dr. Redmond on 11/1.     - follow up in 1 month with tele health appointment with Dr. Redmond, patient can make an appointment with IR by calling the IR booking office at [phone]; recommend IR follow in  1 month .  - khanna removed at 6am (11/1), now on voiding trial - duration 6hrs, once pt voids, can plan for discharge today  - post op labs- stable   - pain control , OTC  motrin 200mg q 6hrs x 7 days.   - case tbd w/Dr. Redmond    Please call IR at [phone] with any questions, concerns, or issues regarding above.

## 2021-11-03 ENCOUNTER — NON-APPOINTMENT (OUTPATIENT)
Age: 66
End: 2021-11-03

## 2021-11-04 ENCOUNTER — NON-APPOINTMENT (OUTPATIENT)
Age: 66
End: 2021-11-04

## 2021-11-08 ENCOUNTER — NON-APPOINTMENT (OUTPATIENT)
Age: 66
End: 2021-11-08

## 2021-11-09 ENCOUNTER — NON-APPOINTMENT (OUTPATIENT)
Age: 66
End: 2021-11-09

## 2021-11-10 PROBLEM — E78.5 HYPERLIPIDEMIA, UNSPECIFIED: Chronic | Status: ACTIVE | Noted: 2021-10-29

## 2021-11-10 PROBLEM — K21.9 GASTRO-ESOPHAGEAL REFLUX DISEASE WITHOUT ESOPHAGITIS: Chronic | Status: ACTIVE | Noted: 2021-10-29

## 2021-11-10 PROBLEM — Z86.69 PERSONAL HISTORY OF OTHER DISEASES OF THE NERVOUS SYSTEM AND SENSE ORGANS: Chronic | Status: ACTIVE | Noted: 2021-10-29

## 2021-11-10 PROBLEM — I10 ESSENTIAL (PRIMARY) HYPERTENSION: Chronic | Status: ACTIVE | Noted: 2021-10-29

## 2021-11-30 ENCOUNTER — APPOINTMENT (OUTPATIENT)
Dept: INTERVENTIONAL RADIOLOGY/VASCULAR | Facility: CLINIC | Age: 66
End: 2021-11-30
Payer: MEDICARE

## 2021-11-30 DIAGNOSIS — Z87.898 PERSONAL HISTORY OF OTHER SPECIFIED CONDITIONS: ICD-10-CM

## 2021-11-30 PROCEDURE — 99212 OFFICE O/P EST SF 10 MIN: CPT | Mod: 95

## 2022-01-11 ENCOUNTER — APPOINTMENT (OUTPATIENT)
Dept: INTERVENTIONAL RADIOLOGY/VASCULAR | Facility: CLINIC | Age: 67
End: 2022-01-11
Payer: MEDICARE

## 2022-01-11 DIAGNOSIS — N13.8 BENIGN PROSTATIC HYPERPLASIA WITH LOWER URINARY TRACT SYMPMS: ICD-10-CM

## 2022-01-11 DIAGNOSIS — N40.1 BENIGN PROSTATIC HYPERPLASIA WITH LOWER URINARY TRACT SYMPMS: ICD-10-CM

## 2022-01-11 PROCEDURE — 99212 OFFICE O/P EST SF 10 MIN: CPT | Mod: 95

## 2022-04-13 ENCOUNTER — APPOINTMENT (OUTPATIENT)
Dept: INTERVENTIONAL RADIOLOGY/VASCULAR | Facility: CLINIC | Age: 67
End: 2022-04-13
Payer: MEDICARE

## 2022-04-13 PROCEDURE — 99212 OFFICE O/P EST SF 10 MIN: CPT | Mod: 95

## 2022-07-12 ENCOUNTER — APPOINTMENT (OUTPATIENT)
Dept: INTERVENTIONAL RADIOLOGY/VASCULAR | Facility: CLINIC | Age: 67
End: 2022-07-12

## 2022-07-12 PROCEDURE — 99443: CPT | Mod: 95

## 2022-07-12 RX ORDER — IBUPROFEN 600 MG/1
600 TABLET, FILM COATED ORAL EVERY 8 HOURS
Qty: 9 | Refills: 0 | Status: DISCONTINUED | COMMUNITY
Start: 2021-11-08 | End: 2022-07-12

## 2022-11-02 ENCOUNTER — APPOINTMENT (OUTPATIENT)
Dept: INTERVENTIONAL RADIOLOGY/VASCULAR | Facility: CLINIC | Age: 67
End: 2022-11-02

## 2022-11-02 PROCEDURE — 99443: CPT | Mod: 95

## 2022-11-02 RX ORDER — FAMOTIDINE 20 MG/1
20 TABLET, FILM COATED ORAL
Refills: 0 | Status: ACTIVE | COMMUNITY

## 2022-12-07 NOTE — PATIENT PROFILE ADULT - FALL HARM RISK TYPE OF ASSESSMENT
Discharge Note  Short Stay      SUMMARY     Admit Date: 12/7/2022    Attending Physician: Jassi Pierre MD        Discharge Physician: Jassi Pierre MD        Discharge Date: 12/7/2022 9:52 AM    Procedure(s) (LRB):  Right L4/L5 and L5/S1 Lumbar RFA (Right)    Final Diagnosis: Lumbar spondylosis [M47.816]    Disposition: Home or self care    Patient Instructions:   Current Discharge Medication List        CONTINUE these medications which have NOT CHANGED    Details   amLODIPine (NORVASC) 2.5 MG tablet Take 1 tablet (2.5 mg total) by mouth once daily.  Qty: 90 tablet, Refills: 3      aspirin (ECOTRIN) 81 MG EC tablet Take 1 tablet (81 mg total) by mouth once daily.  Qty: 90 tablet, Refills: 3      biotin 10,000 mcg Cap Take 1 tablet by mouth once daily.      calcitonin, salmon, (FORTICAL) 200 unit/actuation nasal spray 1 spray by Nasal route once daily.  Qty: 3 each, Refills: 3    Associated Diagnoses: Other osteoporosis without current pathological fracture      carvediloL (COREG) 6.25 MG tablet Take 1 tablet (6.25 mg total) by mouth 2 (two) times daily with meals.  Qty: 180 tablet, Refills: 3      cycloSPORINE modified, NEORAL, (NEORAL) 25 MG capsule TAKE 3 CAPSULES (75 MG TOTAL) BY MOUTH 2 (TWO) TIMES DAILY.  Qty: 540 capsule, Refills: 6    Associated Diagnoses: Heart transplanted      furosemide (LASIX) 20 MG tablet Take 1 tablet (20 mg total) by mouth 2 (two) times a day for 5 days, THEN 1 tablet (20 mg total) once daily. Take 1 tablet daily.  Qty: 370 tablet, Refills: 0    Associated Diagnoses: Heart transplanted; Essential hypertension      multivitamin capsule Take 1 capsule by mouth once daily.      pantoprazole (PROTONIX) 40 MG tablet TAKE 1 TABLET EVERY DAY  Qty: 90 tablet, Refills: 1    Associated Diagnoses: Gastroesophageal reflux disease, unspecified whether esophagitis present      tiZANidine (ZANAFLEX) 4 MG tablet Take 1 tablet (4 mg total) by mouth 2 (two) times daily as needed.  Qty: 180  tablet, Refills: 0    Associated Diagnoses: Lumbar spondylosis      vitamin E 400 UNIT capsule Take 400 Units by mouth once daily.      busPIRone (BUSPAR) 10 MG tablet Take 1 tablet (10 mg total) by mouth once daily.  Qty: 90 tablet, Refills: 3    Associated Diagnoses: Anxiety      DULoxetine (CYMBALTA) 30 MG capsule TAKE 1 CAPSULE EVERY DAY  Qty: 90 capsule, Refills: 1    Comments: To start once complete duloxetine prescription sent to local pharmacy today  Associated Diagnoses: Fibromyalgia      EVENING PRIMROSE OIL ORAL Take 1,000 mg by mouth once daily.      hydrALAZINE (APRESOLINE) 50 MG tablet Take 1 tablet (50 mg total) by mouth every 8 (eight) hours. TAKE 1 TABLETS  EVERY 8  HOURS.  Qty: 270 tablet, Refills: 3    Associated Diagnoses: Essential hypertension      pregabalin (LYRICA) 50 MG capsule TAKE 1 CAPSULE 2 TIMES DAILY AT NOON AND NIGHT TIME  Qty: 180 capsule, Refills: 1    Associated Diagnoses: Fibromyalgia      RETACRIT 20,000 unit/mL injection       temazepam (RESTORIL) 30 mg capsule TAKE 1 CAPSULE AT BEDTIME  Qty: 30 capsule, Refills: 5    Associated Diagnoses: Insomnia, unspecified type      zolpidem (AMBIEN) 10 mg Tab TAKE 1 TABLET BY MOUTH ONCE DAILY IN THE EVENING  Qty: 90 tablet, Refills: 1    Associated Diagnoses: Primary insomnia                 Discharge Diagnosis: Lumbar spondylosis [M47.816]  Condition on Discharge: Stable with no complications to procedure   Diet on Discharge: Same as before.  Activity: as per instruction sheet.  Discharge to: Home with a responsible adult.  Follow up: 2-4 weeks       Please call the office at [phone] if you experience any weakness or loss of sensation, fever > 101.5, pain uncontrolled with oral medications, persistent nausea/vomiting/or diarrhea, redness or drainage from the incisions, or any other worrisome concerns. If physician on call was not reached or could not communicate with our office for any reason please go to the nearest emergency  department       admission

## 2023-01-02 NOTE — H&P PST ADULT - HEMATOLOGY/LYMPHATICS
1/2/2023         RE: Ricky Lea  [address] [place] [address]        Dear Colleague,    Thank you for referring your patient, Ricky Lea, to the [facility]. Please see a copy of my visit note below.    Assessment:   Ricky Lea is a 69 year old y.o. female with past medical history significant for hyperlipidemia, hypertension, depression, osteoporosis (on Fosamax), type 2 diabetes mellitus, urinary incontinence who presents today for follow-up regarding chronic centralized low back pain.  Patient previously complained of pain radiating from the low back down the legs but she states that pain has resolved.  She complains of  knee pain but states that feels like a separate pain from her lower back.  My review of an MRI lumbar spine does not show any overall change compared with her MRI lumbar spine from April 2021.  There are small posterior disc bulges L2-3, L3-4, and L4-5 but no significant spinal canal or neuroforaminal stenosis at any level.   - Patient is status post bilateral L4-5 transforaminal epidural steroid injections December 27, 2021 which provided significant relief of her pain but only lasted 1 week.    -She then failed bilateral L3, L4, L5 medial branch blocks October 18, 2022.  - Patient had 50% relief with bilateral L2, L3 medial branch blocks November 15, 2022.  I checked with the PA team and this is adequate relief to move forward with confirmatory blocks.       Plan:     A shared decision making plan was used.  The patient's values and choices were respected.  The following represents what was discussed and decided upon by the physician assistant and the patient.  A telephone  was used to visit.    1.  DIAGNOSTIC TESTS: I reviewed the MRI lumbar spine.  No further diagnostic tests were ordered.    2.  PHYSICAL THERAPY: Patient completed 7 sessions of physical therapy June 11, 2021.  She will continue with home exercises.  No further physical therapy was  ordered.    3.  MEDICATIONS:  - Patient did not tolerate tizanidine 2 mg due to balance problems.  - Patient will continue Tylenol 1000 g 3 times daily.  - Patient applies diclofenac gel as needed.  - Patient take Cymbalta 60 mg daily.  - Patient takes meloxicam 15 mg daily.  - Patient is not sure if she still takes pregabalin 50 mg twice daily.      4.  INTERVENTIONS: I offered the patient confirmatory bilateral L2, L3 medial branch blocks as a work-up toward radiofrequency ablation.  Patient indicated she would like to proceed and an order was placed.    5.  PATIENT EDUCATION: Patient is in agreement the above plan.  All questions were answered.    6.  FOLLOW-UP: Patient will return to the clinic for confirmatory bilateral L2, L3 medial branch blocks.  If she has questions or concerns in the meantime, she should not hesitate to call.    Subjective:     Ricky Lea is a 69 year old female who presents today for follow-up regarding chronic low back pain.  I last saw the patient November 29, 2022.  At that time she was following up after bilateral L2, L3 medial branch blocks which were performed on November 15, 2022.  She had 50% relief of her pain which was adequate to move forward with confirmatory blocks, but the patient was not sure that she felt that the relief was significant enough to move forward.  I prescribed tizanidine.  Patient reports that she did not tolerate the tizanidine due to balance changes.    Patient complains of centralized low back pain.  She denies any pain down the legs.  Denies numbness, tingling, weakness down the legs.  She rates her pain today as an 8 out of 10.  Pain is aggravated with sitting and walking.  She denies any alleviating factors to the pain.  Denies any new symptoms since she was last seen.    Patient completed 7 sessions of physical therapy in June 2021.  She does her home exercises.  Patient states that she takes  Tylenol 1000 mg 3 times daily, duloxetine 60 mg daily,  meloxicam 15 mg daily, and she applies diclofenac gel.  She is not sure if she is taking pregabalin.    Review of Systems:  Positive for wetting pants, falls.  Negative for numbness/tingling, weakness, loss of bowel/bladder control, inability to urinate, headache, pain much worse at night, difficulty swallowing, difficulty with hand skills, fevers, unintentional weight loss.     Objective:   CONSTITUTIONAL:  Vital signs as above.  No acute distress.  The patient is well nourished and well groomed.    PSYCHIATRIC:  The patient is awake, alert, oriented to person, place and time.  The patient is answering questions appropriately with clear speech.  Normal affect.  HEENT: Normocephalic, atraumatic.  Sclera clear.    SKIN:  Skin over the face, posterior torso, bilateral upper and lower extremities is clean, dry, intact without rashes.  VASCULAR: No significant lower extremity edema.  2/4 bilateral dorsalis pedis pulses.  MUSCULOSKELETAL:  Gait is guarded.    She ambulates with a flexed forward posture at the hips.   able to rise onto toes and heels bilaterally with support.  Tender to palpation midline approximately L3.  Strength is 5/5 bilateral hip flexors, knee flexors/extensors, ankle dorsi/plantar flexors.  NEUROLOGICAL:   Sensation light touch intact bilateral lower extremities throughout.     RESULTS: I reviewed the MRI lumbar spine from [facility] dated October 1, 2022.  This shows degenerative disc changes with posterior disc bulges at L2-3, L3-4, and L4-5.  There is no significant spinal canal or neuroforaminal stenosis at any level.  There is no significant change compared with her MRI from April 2021.  There is a chronic T11 compression fracture.  No acute fracture.             Again, thank you for allowing me to participate in the care of your patient.        Sincerely,        Maureen Palafox PA-C     negative

## 2023-02-21 ENCOUNTER — APPOINTMENT (OUTPATIENT)
Dept: INTERVENTIONAL RADIOLOGY/VASCULAR | Facility: CLINIC | Age: 68
End: 2023-02-21
Payer: MEDICARE

## 2023-02-21 DIAGNOSIS — N40.1 BENIGN PROSTATIC HYPERPLASIA WITH LOWER URINARY TRACT SYMPMS: ICD-10-CM

## 2023-02-21 DIAGNOSIS — R35.1 BENIGN PROSTATIC HYPERPLASIA WITH LOWER URINARY TRACT SYMPMS: ICD-10-CM

## 2023-02-21 PROCEDURE — 99443: CPT | Mod: 95

## 2023-04-05 NOTE — ASU PATIENT PROFILE, ADULT - ALCOHOL USE HISTORY SINGLE SELECT
never Cimetidine Counseling:  I discussed with the patient the risks of Cimetidine including but not limited to gynecomastia, headache, diarrhea, nausea, drowsiness, arrhythmias, pancreatitis, skin rashes, psychosis, bone marrow suppression and kidney toxicity.

## 2023-06-01 ENCOUNTER — NON-APPOINTMENT (OUTPATIENT)
Age: 68
End: 2023-06-01

## 2023-07-11 ENCOUNTER — APPOINTMENT (OUTPATIENT)
Dept: ORTHOPEDIC SURGERY | Facility: CLINIC | Age: 68
End: 2023-07-11
Payer: MEDICARE

## 2023-07-11 ENCOUNTER — NON-APPOINTMENT (OUTPATIENT)
Age: 68
End: 2023-07-11

## 2023-07-11 VITALS — HEIGHT: 69 IN | WEIGHT: 205 LBS | BODY MASS INDEX: 30.36 KG/M2

## 2023-07-11 DIAGNOSIS — M25.551 PAIN IN RIGHT HIP: ICD-10-CM

## 2023-07-11 PROCEDURE — 99203 OFFICE O/P NEW LOW 30 MIN: CPT

## 2023-07-12 NOTE — DISCUSSION/SUMMARY
[de-identified] : Dr. Alaniz  reviewed the clinical case details, and is guiding the patient's orthopedic management. \par The patient adamantly refused to allow PElvis/Hip Xrays to be performed today in office. He firmly preferred to leave the office after my evaluation befor Dr. Alaniz could personally evaluate hi\par \par CT Angio PElvis 10/26/21 [facility]  image with stable F-A cartilaginous spaces from limited view. No Limb Length discrepancy, No other bony lesions.\par \par The patient was advised that based on his clinical picture and exam clinical considerations are for Right hip DJD vs Radicular symptoms from Lumbar nerve root compression. Without Xray images of the  Pelvis and Right hip we can not comment on his cartilaginous spaces in the Right F-A joint space or possible bony lytic lesions considering his current metastatic Prostate CA.\par Possible diagnostic and treatment options for mild and Moderate Hip DJD and LS stenosis were reviewed in detail.\par \par THe patient stated he will return to the office after the Chest CT is performed in 1 month for Hip Xray and re-evaluation.\par He may contact our office if any new problems arise for urgent orthopedic re-evaluation. \par

## 2023-07-12 NOTE — END OF VISIT
[FreeTextEntry3] :  I discussed and reviewed the case details with the PA and agree with the PA's findings and plan as documented in the PA note.\par \par

## 2023-07-12 NOTE — PHYSICAL EXAM
[de-identified] : General/Constitutional: Well appearing, Min/obese 67 year old M in no apparent distress; height and weight as detailed below; vital signs as detailed below\par \par Neuro:\par Orientation/Mental Status: Awake, alert, oriented to time, place, & person.\par Balance: Single leg balance intact on Left / Right @ 5 sec.\par Sensation: intact to fine & deep touch bilat. Feet/toes; \par EHL/AT(Peroneal N.): Bilat: 5/5 \par \par Vascular: DP: Bilat: 2/3 \par Cap refill 1-2 sec. all toes\par Lymph: No enlarged LN in the popliteal chain bilaterally.\par Skin(LE): No cellulitis, edema, and min. venous varicosities bilaterally\par  \par MS:\par Gait: The patient has a stable [ heel/toe stride ] w/o antalgic limp using no AD.\par Function: There is no /  difficulty mounting the exam table.\par Leg Lengths: On exam the heels reveal [alignment] in the supine position which is confirmed at the medial malleoli. \par \par Knees: Both knees have no visible swelling, palpable effusion, or joint tenderness. Both have full active and passive flexion and extension on ROM exam without pain . No instability to medial & lateral stresses noted. Quadriceps/hamstring strength was 5/5 bilaterally.\par \par Alberto sign:  No\par Trendelenberg sign:  No\par There is [ no  ] rotatory positioning of the pelvis \par The [Right] hip has no tenderness in the trochanteric bursa or the groin.\par \par Left Hip ROM:\par SLR= 50 deg.; Flexion= 90-95 deg.; Extension= 0 deg.; Ext. Rot.= 45 deg.;\par Int. Rot.= 5-10 deg.; Abduction= 40 deg.; Adduction= 15 deg.\par \par Right  Hip ROM:\par SLR= 50 deg.; Flexion= 90-95 deg.; Extension= 0 deg.; Ext. Rot.= 45 deg.;\par Int. Rot.= 5-10 deg.; Abduction= 40 deg.; Adduction= 15 deg.\par \par Strength: Hip Flexor/Extensor St.= Bilat: 4/5 \par There is no pain on ROM endpoints. \par There is mild stiffness on Bilateral hip ROM endpoints.\par Stability: No gross klunk/instability with ROM bilat. Hips.\par Incision/scar: 0\par \par  [de-identified] : NO PElvis /  Hip Xrays performed today

## 2023-07-12 NOTE — HISTORY OF PRESENT ILLNESS
[de-identified] : New patient, initial visit. 67 Y / O M  presenting today for evaluation of ongoing Right groin, lateral Gt area, & BUttock pain with distance walking, getting in/out of chair, and ascending & descending the stairs. Progressing over past year. No inciting fall or hip area injury. No abrupt activity modification other than recalled household work. No prior hip milestones or injuries. Most weeks occurring intermittently, recently occurring daily. Recently worst at end of days activity. HAs intermittent radiation of pain down lateral side to knee. Unsure if he has extension down to ankle.\par OF note amidst work up of metastatic Prostate CA to Lungs. HAd recent Pelvis CT and chest CT within post 3 mos. \par States has no bone mets to femurs. Seeing interventional Radiologist and Oncologist.\par NO pain Rx take. Min limp. No AD used.\par Pt adamantly maintains that was told by Oncology MD to NOT perform any further body Xrays due to cumulative Radiation dose and subsequent dangers of Xray exposure.\par Of note states has H/O LS HNP and probable stenosis. States had LS MRI performed by AJ Epperson MD

## 2023-09-23 NOTE — REVIEW OF SYSTEMS
[Joint Pain] : joint pain [Negative] : Heme/Lymph [FreeTextEntry9] : C Spine Dx [FreeTextEntry6] : Lung Mets from Prostate CA [FreeTextEntry8] : Preostate CA Yes

## 2023-10-17 NOTE — PATIENT PROFILE ADULT - VISION (WITH CORRECTIVE LENSES IF THE PATIENT USUALLY WEARS THEM):
Type 2 Diabetes Management for Adults   WHAT YOU NEED TO KNOW:   What do I need to know about type 2 diabetes management? Type 2 diabetes is a disease that affects how your body uses glucose (sugar). Either your body cannot make enough insulin, or it cannot use the insulin correctly. It is important to keep diabetes controlled to prevent damage to your heart, blood vessels, and other organs. Management will help you feel well and enjoy your daily activities. Your diabetes care team providers can help you make a plan to fit diabetes care into your schedule. Your plan can change over time to fit your needs and your family's needs. What do I need to know about high blood sugar levels? High blood sugar levels may not cause any symptoms. You may feel more thirsty or urinate more often than usual. Over time, high blood sugar levels can damage your nerves, blood vessels, tissues, and organs. The following can increase your blood sugar levels:  Large meals or large amounts of carbohydrates at one time    Less physical activity    Stress    Illness    A lower dose of diabetes medicine or insulin, or a late dose    What do I need to know about low blood sugar levels? Symptoms include feeling shaky, dizzy, irritable, or confused. You can prevent symptoms by keeping your blood sugar levels from going too low. Treat a low blood sugar level right away:      Drink 4 ounces of juice or have 1 tube of glucose gel. Check your blood sugar level again 10 to 15 minutes later. When the level goes back to normal, eat a meal or snack to prevent another decrease. Keep glucose gel, raisins, or hard candy with you at all times to treat a low blood sugar level. Your blood sugar level can get too low if you take diabetes medicine or insulin and do not eat enough food. If you use insulin, check your blood sugar level before you exercise.       If your blood sugar level is below 100 mg/dL, eat 4 crackers or 2 ounces of raisins, or drink 4 ounces of juice. Check your level every 30 minutes if you exercise longer than 1 hour. You may need a snack during or after exercise. What can I do to manage my blood sugar levels? Check your blood sugar levels as directed and as needed. Several items are available to use to check your levels. You may need to check by testing a drop of blood in a glucose monitor. You may instead be given a continuous glucose monitoring (CGM) device. The device is worn at all times. The CGM checks your blood sugar level every 5 minutes. It sends results to an electronic device such as a smart phone. A CGM can be used with or without an insulin pump. You and your diabetes care team providers will decide on the best method for you. The goal for blood sugar levels before meals  is between 80 and 130 mg/dL and 2 hours after eating  is lower than 180 mg/dL. Make healthy food choices. Work with a dietitian to create a meal plan that works for you and your schedule. A dietitian can help you learn how to eat the right amount of carbohydrates (sugar and starchy foods) during your meals and snacks. Examples of carbohydrates are breads, cereals, rice, pasta, fruit, low-fat dairy, and sweets. Carbohydrates can raise your blood sugar level if you eat too many at one time. Eat high-fiber foods as directed. Fiber helps improve blood sugar levels. Fiber also lowers your risk for heart disease and other problems diabetes can cause. Examples of high-fiber foods include vegetables, whole-grain bread, and beans such as cavazos beans. Your dietitian can tell you how much fiber to have each day. Get regular physical activity. Physical activity can help you get to your target blood sugar level goal and manage your weight. Get at least 150 minutes of moderate to vigorous aerobic physical activity each week. Resistance training, such as lifting weights, should be done 3 times each week.  Do not miss more than 2 days of physical activity in a row. Do not sit longer than 30 minutes at a time. Your healthcare provider can help you create an activity plan. The plan can include the best activities for you and can help you build your strength and endurance. Maintain a healthy weight. Ask your team what a healthy weight is for you. A healthy weight can help you control diabetes and prevent heart disease. Ask your team to help you create a weight-loss plan, if needed. Even a loss of 3% to 7% of your excess body weight can help make a difference in managing diabetes. Your team will help you set a weight-loss goal, such as 10 to 15 pounds, or 5% of your extra weight. Together you and your team can set manageable weight-loss goals. Take your diabetes medicine or insulin as directed. You may need diabetes medicine, insulin, or both to help control your blood sugar levels. Your provider will teach you how and when to take your diabetes medicine or insulin. You will also be taught about side effects oral diabetes medicine can cause. Insulin may be injected or given through a pump or pen. You and your providers will decide on the best method for you: An insulin pump  is an implanted device that gives your insulin 24 hours a day. An insulin pump prevents the need for multiple insulin injections in a day. An insulin pen  is a device prefilled with the right amount of insulin. You and your family members will be taught how to draw up and give insulin  if this is the best method for you. Your providers will also teach you how to dispose of needles and syringes. You will learn how much insulin you need  and when to give it. You will be taught when not to give insulin. You will also be taught what to do if your blood sugar level drops too low. This may happen if you take insulin and do not eat the right amount of carbohydrates. What else can I do to manage type 2 diabetes?    Wear medical alert identification. Wear medical alert jewelry or carry a card that says you have diabetes. Ask your care team provider where to get these items. Do not smoke. Nicotine and other chemicals in cigarettes and cigars can cause lung and blood vessel damage. It also makes it more difficult to manage your diabetes. Ask your provider for information if you currently smoke and need help to quit. Do not use e-cigarettes or smokeless tobacco in place of cigarettes or to help you quit. They still contain nicotine. Check your feet each day for cuts, scratches, calluses, or other wounds. Look for redness and swelling, and feel for warmth. Wear shoes that fit well. Check your shoes for rocks or other objects that can hurt your feet. Do not walk barefoot or wear shoes without socks. Wear cotton socks to help keep your feet dry. Ask about vaccines you may need. You have a higher risk for serious illness if you get the flu, pneumonia, COVID-19, or hepatitis. Ask your provider if you should get vaccines to prevent these or other diseases, and when to get the vaccines. Talk to your provider if you become stressed about diabetes care. Sometimes being able to fit diabetes care into your life can cause increased stress. The stress can cause you not to take care of yourself properly. Your provider can help by offering tips about self-care. A mental health provider can listen and offer help with self-care issues. Other types of counseling can help you make nutrition or physical activity changes. Have your A1c checked as directed. Your provider may check your A1c every 3 months, or 2 times each year if your diabetes is controlled. An A1c test shows the average amount of sugar in your blood over the past 2 to 3 months. Your provider will tell you what your A1c level should be. Have screening tests as directed.   Your provider may recommend screening for complications of diabetes and other conditions that may develop. Some screenings may begin right away and some may happen within the first 5 years of diagnosis:    Examples of diabetes complications  include kidney problems, high cholesterol, high blood pressure, blood vessel problems, eye problems, and sleep apnea. You may be screened for a low vitamin B level  if you take oral diabetes medicine for a long time. You may be screened for polycystic ovarian syndrome (PCOS)  if you are of childbearing age. Have someone call your local emergency number (911 in the [address]) if:   You cannot be woken. You have signs of diabetic ketoacidosis:     confusion, fatigue    vomiting    rapid heartbeat    fruity smelling breath    extreme thirst    dry mouth and skin    You have any of the following signs of a heart attack:      Squeezing, pressure, or pain in your chest    You may  also have any of the following:     Discomfort or pain in your back, neck, jaw, stomach, or arm    Shortness of breath    Nausea or vomiting    Lightheadedness or a sudden cold sweat    You have any of the following signs of a stroke:      Numbness or drooping on one side of your face     Weakness in an arm or leg    Confusion or difficulty speaking    Dizziness, a severe headache, or vision loss    When should I call my doctor or diabetes care team provider? You have a sore or wound that will not heal.    You have a change in the amount you urinate. Your blood sugar levels are higher than your target goals. You often have lower blood sugar levels than your target goals. Your skin is red, dry, warm, or swollen. You have trouble coping with diabetes, or you feel anxious or depressed. You have trouble following any part of your care plan, such as your meal plan. You have questions or concerns about your condition or care. CARE AGREEMENT:   You have the right to help plan your care. Learn about your health condition and how it may be treated.  Discuss treatment options with your healthcare providers to decide what care you want to receive. You always have the right to refuse treatment. The above information is an  only. It is not intended as medical advice for individual conditions or treatments. Talk to your doctor, nurse or pharmacist before following any medical regimen to see if it is safe and effective for you. © Copyright [employer] 2023 Information is for End User's use only and may not be sold, redistributed or otherwise used for commercial purposes. Normal vision: sees adequately in most situations; can see medication labels, newsprint

## 2024-06-12 ENCOUNTER — APPOINTMENT (OUTPATIENT)
Dept: ORTHOPEDIC SURGERY | Facility: CLINIC | Age: 69
End: 2024-06-12
Payer: MEDICARE

## 2024-06-12 VITALS
WEIGHT: 208 LBS | HEART RATE: 76 BPM | HEIGHT: 69 IN | SYSTOLIC BLOOD PRESSURE: 151 MMHG | DIASTOLIC BLOOD PRESSURE: 81 MMHG | BODY MASS INDEX: 30.81 KG/M2

## 2024-06-12 DIAGNOSIS — M47.816 SPONDYLOSIS W/OUT MYELOPATHY OR RADICULOPATHY, LUMBAR REGION: ICD-10-CM

## 2024-06-12 DIAGNOSIS — M47.812 SPONDYLOSIS W/OUT MYELOPATHY OR RADICULOPATHY, CERVICAL REGION: ICD-10-CM

## 2024-06-12 DIAGNOSIS — Z87.39 PERSONAL HISTORY OF OTHER DISEASES OF THE MUSCULOSKELETAL SYSTEM AND CONNECTIVE TISSUE: ICD-10-CM

## 2024-06-12 PROCEDURE — 99214 OFFICE O/P EST MOD 30 MIN: CPT

## 2024-06-12 PROCEDURE — 72110 X-RAY EXAM L-2 SPINE 4/>VWS: CPT

## 2024-06-13 NOTE — HISTORY OF PRESENT ILLNESS
[___ mths] : [unfilled] month(s) ago [Walking] : walking [Intermit.] : ~He/She~ states the symptoms seem to be intermittent [de-identified] : A 68-year-old male presents an initial visit for lower back pain with radiculopathy. Referred by Dr. Alaniz.  The patient states that he had neck and lower back pain prior to one month ago, but over the course of the past month, he's had increased pain pertaining to both his neck and lower back. He states that he finds the most pain when he wakes up in the morning but alleviates as the day progresses. 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 He continues to have radiating symptoms to his left leg. Regarding his neck symptoms, he states that he's also had neck pain of years, however, he's recently had an MRI of his Brain to rule out any tumors or aneurysms, as he has frequent headaches and radicular symptoms to his upper extremities and occasional numbness and to his upper extremities. He notes a limited range of motion to his neck. The patient currently takes no pain medication at this time.

## 2024-06-13 NOTE — DISCUSSION/SUMMARY
[6 Weeks] : in 6 weeks [de-identified] : I discussed the underlying pathology of the patient conditions and the X-Rays findings. 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 I highly recommended that the patient starts a course of physical therapy at this time. I provided the patient with a detailed prescription for physical therapy. The patient should follow-up with me in 6 weeks for further assessment of his symptoms.

## 2024-06-13 NOTE — PHYSICAL EXAM
[LE] : Sensory: Intact in bilateral lower extremities [0] : left ankle jerk 0 [Normal RLE] : Right Lower Extremity: No scars, rashes, lesions, ulcers, skin intact [Normal LLE] : Left Lower Extremity: No scars, rashes, lesions, ulcers, skin intact [Obese] : obese [Normal] : Oriented to person, place, and time, insight and judgement were intact and the affect was normal [de-identified] : AP & Lateral Flexion Extension X-Rays 4 Views of the Lumbar Spine performed on 06/12/2024 shows multi discogenic disease with loss of the normal lumbar lordosis, traction syndesmophytes with significant disc height narrowing.

## 2024-06-13 NOTE — ADDENDUM
[FreeTextEntry1] : I, Wellington Valdovinos Jr, acted solely as a scribe for Dr. Rowdy Meadows on this date 06/12/2024 .  All medical record entries made by the Scribe were at my, Dr. Rowdy Meadows, direction and personally dictated by me on 06/12/2024 . I have reviewed the chart and agree that the record accurately reflects my personal performance of the history, physical exam, assessment and plan. I have also personally directed, reviewed, and agreed with the chart.

## 2025-03-13 ENCOUNTER — OFFICE (OUTPATIENT)
Dept: URBAN - METROPOLITAN AREA CLINIC 88 | Facility: CLINIC | Age: 70
Setting detail: OPHTHALMOLOGY
End: 2025-03-13
Payer: MEDICARE

## 2025-03-13 ENCOUNTER — RX ONLY (RX ONLY)
Age: 70
End: 2025-03-13

## 2025-03-13 DIAGNOSIS — H35.40: ICD-10-CM

## 2025-03-13 DIAGNOSIS — H43.392: ICD-10-CM

## 2025-03-13 DIAGNOSIS — H43.813: ICD-10-CM

## 2025-03-13 DIAGNOSIS — H35.413: ICD-10-CM

## 2025-03-13 PROBLEM — H25.13 CATARACT SENILE NUCLEAR SCLEROSIS; BOTH EYES: Status: ACTIVE | Noted: 2025-03-13

## 2025-03-13 PROCEDURE — 92250 FUNDUS PHOTOGRAPHY W/I&R: CPT | Performed by: OPHTHALMOLOGY

## 2025-03-13 PROCEDURE — 92004 COMPRE OPH EXAM NEW PT 1/>: CPT | Performed by: OPHTHALMOLOGY

## 2025-03-13 ASSESSMENT — CONFRONTATIONAL VISUAL FIELD TEST (CVF)
OS_FINDINGS: FULL
OD_FINDINGS: FULL

## 2025-03-13 ASSESSMENT — VISUAL ACUITY
OS_BCVA: 20/20-1
OD_BCVA: 20/20